# Patient Record
Sex: MALE | Race: OTHER | HISPANIC OR LATINO | URBAN - METROPOLITAN AREA
[De-identification: names, ages, dates, MRNs, and addresses within clinical notes are randomized per-mention and may not be internally consistent; named-entity substitution may affect disease eponyms.]

---

## 2019-06-11 ENCOUNTER — EMERGENCY (EMERGENCY)
Facility: HOSPITAL | Age: 39
LOS: 1 days | Discharge: HOME | End: 2019-06-11
Attending: EMERGENCY MEDICINE
Payer: MEDICAID

## 2019-06-11 VITALS
OXYGEN SATURATION: 98 % | HEART RATE: 74 BPM | WEIGHT: 190.04 LBS | SYSTOLIC BLOOD PRESSURE: 130 MMHG | DIASTOLIC BLOOD PRESSURE: 83 MMHG | TEMPERATURE: 98 F | HEIGHT: 67 IN | RESPIRATION RATE: 18 BRPM

## 2019-06-11 LAB
ALBUMIN SERPL ELPH-MCNC: 3.9 G/DL — SIGNIFICANT CHANGE UP (ref 3.3–5)
ALP SERPL-CCNC: 75 U/L — SIGNIFICANT CHANGE UP (ref 40–120)
ALT FLD-CCNC: 15 U/L — SIGNIFICANT CHANGE UP (ref 10–45)
ANION GAP SERPL CALC-SCNC: 14 MMOL/L — SIGNIFICANT CHANGE UP (ref 5–17)
APPEARANCE UR: CLEAR — SIGNIFICANT CHANGE UP
AST SERPL-CCNC: 14 U/L — SIGNIFICANT CHANGE UP (ref 10–40)
BACTERIA # UR AUTO: NEGATIVE — SIGNIFICANT CHANGE UP
BASOPHILS # BLD AUTO: 0 K/UL — SIGNIFICANT CHANGE UP (ref 0–0.2)
BASOPHILS NFR BLD AUTO: 0.2 % — SIGNIFICANT CHANGE UP (ref 0–2)
BILIRUB SERPL-MCNC: 0.1 MG/DL — LOW (ref 0.2–1.2)
BILIRUB UR-MCNC: NEGATIVE — SIGNIFICANT CHANGE UP
BUN SERPL-MCNC: 18 MG/DL — SIGNIFICANT CHANGE UP (ref 7–23)
CALCIUM SERPL-MCNC: 9.3 MG/DL — SIGNIFICANT CHANGE UP (ref 8.4–10.5)
CHLORIDE SERPL-SCNC: 104 MMOL/L — SIGNIFICANT CHANGE UP (ref 96–108)
CO2 SERPL-SCNC: 23 MMOL/L — SIGNIFICANT CHANGE UP (ref 22–31)
COLOR SPEC: SIGNIFICANT CHANGE UP
CREAT SERPL-MCNC: 1.41 MG/DL — HIGH (ref 0.5–1.3)
DIFF PNL FLD: ABNORMAL
EOSINOPHIL # BLD AUTO: 0.1 K/UL — SIGNIFICANT CHANGE UP (ref 0–0.5)
EOSINOPHIL NFR BLD AUTO: 0.5 % — SIGNIFICANT CHANGE UP (ref 0–6)
EPI CELLS # UR: 2 /HPF — SIGNIFICANT CHANGE UP
GLUCOSE SERPL-MCNC: 108 MG/DL — HIGH (ref 70–99)
GLUCOSE UR QL: NEGATIVE — SIGNIFICANT CHANGE UP
HCT VFR BLD CALC: 42.1 % — SIGNIFICANT CHANGE UP (ref 39–50)
HGB BLD-MCNC: 14 G/DL — SIGNIFICANT CHANGE UP (ref 13–17)
HYALINE CASTS # UR AUTO: 1 /LPF — SIGNIFICANT CHANGE UP (ref 0–2)
KETONES UR-MCNC: NEGATIVE — SIGNIFICANT CHANGE UP
LEUKOCYTE ESTERASE UR-ACNC: NEGATIVE — SIGNIFICANT CHANGE UP
LIDOCAIN IGE QN: 34 U/L — SIGNIFICANT CHANGE UP (ref 7–60)
LYMPHOCYTES # BLD AUTO: 2.8 K/UL — SIGNIFICANT CHANGE UP (ref 1–3.3)
LYMPHOCYTES # BLD AUTO: 24.2 % — SIGNIFICANT CHANGE UP (ref 13–44)
MCHC RBC-ENTMCNC: 29.1 PG — SIGNIFICANT CHANGE UP (ref 27–34)
MCHC RBC-ENTMCNC: 33.4 GM/DL — SIGNIFICANT CHANGE UP (ref 32–36)
MCV RBC AUTO: 87.3 FL — SIGNIFICANT CHANGE UP (ref 80–100)
MONOCYTES # BLD AUTO: 1.1 K/UL — HIGH (ref 0–0.9)
MONOCYTES NFR BLD AUTO: 9.2 % — SIGNIFICANT CHANGE UP (ref 2–14)
NEUTROPHILS # BLD AUTO: 7.6 K/UL — HIGH (ref 1.8–7.4)
NEUTROPHILS NFR BLD AUTO: 65.9 % — SIGNIFICANT CHANGE UP (ref 43–77)
NITRITE UR-MCNC: NEGATIVE — SIGNIFICANT CHANGE UP
PH UR: 6 — SIGNIFICANT CHANGE UP (ref 5–8)
PLATELET # BLD AUTO: 197 K/UL — SIGNIFICANT CHANGE UP (ref 150–400)
POTASSIUM SERPL-MCNC: 4 MMOL/L — SIGNIFICANT CHANGE UP (ref 3.5–5.3)
POTASSIUM SERPL-SCNC: 4 MMOL/L — SIGNIFICANT CHANGE UP (ref 3.5–5.3)
PROT SERPL-MCNC: 7.1 G/DL — SIGNIFICANT CHANGE UP (ref 6–8.3)
PROT UR-MCNC: ABNORMAL
RBC # BLD: 4.82 M/UL — SIGNIFICANT CHANGE UP (ref 4.2–5.8)
RBC # FLD: 12.3 % — SIGNIFICANT CHANGE UP (ref 10.3–14.5)
RBC CASTS # UR COMP ASSIST: 2 /HPF — SIGNIFICANT CHANGE UP (ref 0–4)
SODIUM SERPL-SCNC: 141 MMOL/L — SIGNIFICANT CHANGE UP (ref 135–145)
SP GR SPEC: 1.03 — HIGH (ref 1.01–1.02)
UROBILINOGEN FLD QL: NEGATIVE — SIGNIFICANT CHANGE UP
WBC # BLD: 11.5 K/UL — HIGH (ref 3.8–10.5)
WBC # FLD AUTO: 11.5 K/UL — HIGH (ref 3.8–10.5)
WBC UR QL: 3 /HPF — SIGNIFICANT CHANGE UP (ref 0–5)

## 2019-06-11 PROCEDURE — 99218: CPT

## 2019-06-11 PROCEDURE — 74176 CT ABD & PELVIS W/O CONTRAST: CPT | Mod: 26

## 2019-06-11 RX ORDER — TAMSULOSIN HYDROCHLORIDE 0.4 MG/1
0.4 CAPSULE ORAL ONCE
Refills: 0 | Status: COMPLETED | OUTPATIENT
Start: 2019-06-11 | End: 2019-06-11

## 2019-06-11 RX ORDER — DOCUSATE SODIUM 100 MG
100 CAPSULE ORAL DAILY
Refills: 0 | Status: DISCONTINUED | OUTPATIENT
Start: 2019-06-11 | End: 2019-06-15

## 2019-06-11 RX ORDER — ONDANSETRON 8 MG/1
4 TABLET, FILM COATED ORAL ONCE
Refills: 0 | Status: COMPLETED | OUTPATIENT
Start: 2019-06-11 | End: 2019-06-11

## 2019-06-11 RX ORDER — SODIUM CHLORIDE 9 MG/ML
1000 INJECTION, SOLUTION INTRAVENOUS ONCE
Refills: 0 | Status: COMPLETED | OUTPATIENT
Start: 2019-06-11 | End: 2019-06-11

## 2019-06-11 RX ORDER — MORPHINE SULFATE 50 MG/1
4 CAPSULE, EXTENDED RELEASE ORAL ONCE
Refills: 0 | Status: DISCONTINUED | OUTPATIENT
Start: 2019-06-11 | End: 2019-06-11

## 2019-06-11 RX ORDER — SODIUM CHLORIDE 9 MG/ML
1000 INJECTION INTRAMUSCULAR; INTRAVENOUS; SUBCUTANEOUS ONCE
Refills: 0 | Status: COMPLETED | OUTPATIENT
Start: 2019-06-11 | End: 2019-06-11

## 2019-06-11 RX ORDER — SODIUM CHLORIDE 9 MG/ML
3 INJECTION INTRAMUSCULAR; INTRAVENOUS; SUBCUTANEOUS EVERY 8 HOURS
Refills: 0 | Status: DISCONTINUED | OUTPATIENT
Start: 2019-06-11 | End: 2019-06-15

## 2019-06-11 RX ORDER — SODIUM CHLORIDE 9 MG/ML
1000 INJECTION INTRAMUSCULAR; INTRAVENOUS; SUBCUTANEOUS
Refills: 0 | Status: DISCONTINUED | OUTPATIENT
Start: 2019-06-11 | End: 2019-06-15

## 2019-06-11 RX ORDER — MORPHINE SULFATE 50 MG/1
4 CAPSULE, EXTENDED RELEASE ORAL ONCE
Refills: 0 | Status: DISCONTINUED | OUTPATIENT
Start: 2019-06-11 | End: 2019-06-15

## 2019-06-11 RX ORDER — KETOROLAC TROMETHAMINE 30 MG/ML
15 SYRINGE (ML) INJECTION ONCE
Refills: 0 | Status: DISCONTINUED | OUTPATIENT
Start: 2019-06-11 | End: 2019-06-11

## 2019-06-11 RX ADMIN — MORPHINE SULFATE 4 MILLIGRAM(S): 50 CAPSULE, EXTENDED RELEASE ORAL at 18:10

## 2019-06-11 RX ADMIN — MORPHINE SULFATE 4 MILLIGRAM(S): 50 CAPSULE, EXTENDED RELEASE ORAL at 19:00

## 2019-06-11 RX ADMIN — ONDANSETRON 4 MILLIGRAM(S): 8 TABLET, FILM COATED ORAL at 18:10

## 2019-06-11 RX ADMIN — TAMSULOSIN HYDROCHLORIDE 0.4 MILLIGRAM(S): 0.4 CAPSULE ORAL at 21:27

## 2019-06-11 RX ADMIN — Medication 15 MILLIGRAM(S): at 18:10

## 2019-06-11 RX ADMIN — SODIUM CHLORIDE 3 MILLILITER(S): 9 INJECTION INTRAMUSCULAR; INTRAVENOUS; SUBCUTANEOUS at 22:35

## 2019-06-11 RX ADMIN — SODIUM CHLORIDE 150 MILLILITER(S): 9 INJECTION INTRAMUSCULAR; INTRAVENOUS; SUBCUTANEOUS at 22:35

## 2019-06-11 RX ADMIN — SODIUM CHLORIDE 1000 MILLILITER(S): 9 INJECTION INTRAMUSCULAR; INTRAVENOUS; SUBCUTANEOUS at 18:10

## 2019-06-11 RX ADMIN — SODIUM CHLORIDE 1000 MILLILITER(S): 9 INJECTION, SOLUTION INTRAVENOUS at 20:55

## 2019-06-11 RX ADMIN — Medication 15 MILLIGRAM(S): at 19:00

## 2019-06-11 RX ADMIN — Medication 100 MILLIGRAM(S): at 22:34

## 2019-06-11 NOTE — CONSULT NOTE ADULT - SUBJECTIVE AND OBJECTIVE BOX
HPI:  Patient is a 39y Male who presented with complaints of right flank pain x early this am.  subjective fever and chills- took ibuprofen this am.  +nausea, no vomiting  +dysuria, no hematuria  no hx of nephrolithiasis    PAST MEDICAL & SURGICAL HISTORY:  No pertinent past medical history  No significant past surgical history    FAMILY HISTORY:    SOCIAL HISTORY:   Tobacco hx:    MEDICATIONS  (STANDING):    MEDICATIONS  (PRN):    Allergies    No Known Allergies    Intolerances        REVIEW OF SYSTEMS: Pertinent positives and negatives as stated in HPI, otherwise negative    Vital signs  T(C): 36.6 (19 @ 19:02), Max: 36.7 (19 @ 17:33)  HR: 72 (19 @ 19:02)  BP: 111/67 (19 @ 19:02)  SpO2: 97% (19 @ 19:02)    Physical Exam  Gen: NAD  Pulm: No intercostal retractions  Back: No CVAT b/l  Abd: Soft, +RLQ tenderness to deep palpation, no rebound, no guarding  : wnl    LABS:     @ 18:26    WBC 11.5  / Hct 42.1  / SCr 1.41         141  |  104  |  18  ----------------------------<  108<H>  4.0   |  23  |  1.41<H>    Ca    9.3      2019 18:26    TPro  7.1  /  Alb  3.9  /  TBili  0.1<L>  /  DBili  x   /  AST  14  /  ALT  15  /  AlkPhos  75        Urinalysis Basic - ( 2019 18:26 )    Color: Light Yellow / Appearance: Clear / S.034 / pH: x  Gluc: x / Ketone: Negative  / Bili: Negative / Urobili: Negative   Blood: x / Protein: Trace / Nitrite: Negative   Leuk Esterase: Negative / RBC: 2 /hpf / WBC 3 /HPF   Sq Epi: x / Non Sq Epi: 2 /hpf / Bacteria: Negative      RADIOLOGY:  EXAM:  CT ABDOMEN AND PELVIS                            PROCEDURE DATE:  2019        INTERPRETATION:  CLINICAL INFORMATION: Right flank pain. Stone disease   suspected.    COMPARISON: None.    PROCEDURE:   CT of the Abdomen and Pelvis was performed without intravenous contrast   in the prone position.  Intravenous contrast: None.  Oral contrast: None.  Sagittal and coronal reformats were performed.    FINDINGS:    Evaluation of the solid organs and vasculature is limited in the absence   of intravenous contrast.    LOWER CHEST: Calcified granuloma in the right lower lobe.    LIVER: Within normal limits.  BILE DUCTS: Normal caliber.  GALLBLADDER: Within normal limits.  SPLEEN: Within normal limits.  PANCREAS: Within normal limits.  ADRENALS: Within normal limits.  KIDNEYS/URETERS: Mild right hydronephrosis and right perinephric   stranding due to a 2 mm renal calculus within the distal right ureter. No   hydronephrosis or nephrolithiasis in the left renal collecting system.    BLADDER: The urinary bladder is underdistended.  REPRODUCTIVE ORGANS: The prostate is normal in size.    BOWEL: No bowel obstruction. Appendix is normal.  PERITONEUM: No ascites.  VESSELS:  Within normal limits.  RETROPERITONEUM: No lymphadenopathy.    ABDOMINAL WALL: Within normal limits.  BONES: Within normal limits.    IMPRESSION:     Mild right hydronephrosis due to a 2 mm renal calculus in the distal   right ureter.      INOCENTE VENCES M.D., RADIOLOGY RESIDENT  This document has been electronically signed.  FRANKLIN BIRMINGHAM M.D., ATTENDING RADIOLOGIST  This document has been electronically signed. 2019  7:09PM

## 2019-06-11 NOTE — ED PROVIDER NOTE - MUSCULOSKELETAL, MLM
Spine appears normal, range of motion including B/L knees is not limited, no muscle or joint tenderness. No bony point ttp of femur, patella or prox tib/fib of either extremity. Ambulating c/ ease.

## 2019-06-11 NOTE — ED PROVIDER NOTE - PROGRESS NOTE DETAILS
note appreciated. Will place in CDU for more continuous IVF and to allow for trending of his renal fxn prior to final disposition.

## 2019-06-11 NOTE — ED ADULT NURSE NOTE - OBJECTIVE STATEMENT
40 yo presents to the ED from home. A&Ox4, ambulatory c/o Right flank pain radiating to groin since this AM. painful urination. reports chills this morning, no fever. reports nausea, no vomiting. no history of kidney stones. pt well appearing. did not take anything at home for pain control. reports that he fell while on the way to bathroom today and reports bilateral knee pain since the fall. ambulating without any difficulties. 20G LAC. pt well appearing. afebrile upon assessment.

## 2019-06-11 NOTE — ED PROVIDER NOTE - OBJECTIVE STATEMENT
Otherwise healthy 40 y/o Citizen of [place]-speaking (translation provided by [facility]) male who presents c/ a cc of R flank pain since earlier this AM.

## 2019-06-11 NOTE — CONSULT NOTE ADULT - ATTENDING COMMENTS
Agree with assessment and plan.    CT scan demonstrated 2 mm right ureteral stone.  Hydration  Expulsive medical therapy with Flomax  Outpatient followup in 1 week.    Discussed with Dr. Neto Mckeon

## 2019-06-11 NOTE — ED PROVIDER NOTE - CHPI ED SYMPTOMS POS
PAIN/DYSURIA/NAUSEA/CHILLS/PAINFUL URINATION/Also mentions his falling on his knees and now has B/L knee pain.

## 2019-06-11 NOTE — ED PROVIDER NOTE - CLINICAL SUMMARY MEDICAL DECISION MAKING FREE TEXT BOX
Appropriate screening studies obtained. Analgesia, antiemetic and IVF provided. Does not need imaging of his knees. Will follow his studies, reassess and treat/dispo accordingly.

## 2019-06-11 NOTE — ED CDU PROVIDER INITIAL DAY NOTE - CHPI ED SYMPTOMS POS
NAUSEA/PAINFUL URINATION/CHILLS/DYSURIA/PAIN/Also mentions his falling on his knees and now has B/L knee pain.

## 2019-06-11 NOTE — ED CDU PROVIDER INITIAL DAY NOTE - OBJECTIVE STATEMENT
Otherwise healthy 40 y/o [place]ian-speaking (translation provided by [facility]) male who presents c/ a cc of R flank pain since earlier this AM. 40 y/o male denies significant pmhx presented to ED c/o R flank pain since earlier this AM. Pt described pain as sharp accompanied by nausea, chills and painful urination.  In ED, patient had elevated Creatinine 1.41 and CT abdomen/pelvis w/o contrast showing Mild right hydronephrosis due to a 2 mm renal calculus in the distal right ureter. Patient given Flomax 0.4mg evaluated by Urology and sent to CDU for frequent reeval, vitals q 4hrs, pain control and IVF hydration.

## 2019-06-11 NOTE — CONSULT NOTE ADULT - ASSESSMENT
40 y/o male with 2mm right distal ureteral calculi      Rec:  -please send urine culture  -aggressive hydration  -strain urine  -can dc with analgesics and flomax 0.4mg daily vs CDU for creatinine recheck in the am  -if dc, return to ED if fever, poor pain control or intractable nausea/vomiting  will discuss with attending, full recs to follow 40 y/o male with 2mm right distal ureteral calculi      Rec:  -f/u Ucx  -Trial of medical expulsive therapy  -Flomax 0.4mg qHS x30 days  -Tylenol, ibuprofen, & Tramadol PRN pain  -Zofran PRN nausea  -Senna, colace, miralax  -Aggressive hydration  -Strain urine for calculi  -Return to ER for any fever > 100.4, pain uncontrolled on discharge pain medications, or inability to tolerate PO  -Follow up with Dr. Yeung in clinic [phone].     Discussed with Dr. Yeung

## 2019-06-11 NOTE — ED CDU PROVIDER INITIAL DAY NOTE - MEDICAL DECISION MAKING DETAILS
Placed in the CDU to allow for clinical reassessment, more continuous IV hydration and trending of his renal fxn.

## 2019-06-11 NOTE — ED CDU PROVIDER INITIAL DAY NOTE - PROGRESS NOTE DETAILS
CDU PROGRESS NOTE PA KAVYA: Pt resting comfortably, NAD, VSS. Pt reports having mild discomfort to right flank 5/10 in severity and feeling constipated. Pt declined pain medication. Will continue to monitor, Colace 100mg po given.

## 2019-06-11 NOTE — ED ADULT NURSE NOTE - BREATH SOUNDS, MLM
Please send letter to patient informing her that her opiate medication agreement has been terminated as of today, 5/5/17.  No further opiates will be provided due to inappropriate UDS, specifically she was + for THC.     Please call pharmacy and cancel all tramadol refills for this patient. She last filled 5/1/17 at [facility] [address].   
Prescription refills for Tramadol 50 mg were cancelled at [facility]. Discontinuation of narcotics letter mailed to patient today.  
Clear

## 2019-06-12 VITALS
HEART RATE: 76 BPM | RESPIRATION RATE: 18 BRPM | SYSTOLIC BLOOD PRESSURE: 120 MMHG | TEMPERATURE: 98 F | DIASTOLIC BLOOD PRESSURE: 76 MMHG | OXYGEN SATURATION: 98 %

## 2019-06-12 LAB
ANION GAP SERPL CALC-SCNC: 8 MMOL/L — SIGNIFICANT CHANGE UP (ref 5–17)
BASOPHILS # BLD AUTO: 0 K/UL — SIGNIFICANT CHANGE UP (ref 0–0.2)
BASOPHILS NFR BLD AUTO: 0.3 % — SIGNIFICANT CHANGE UP (ref 0–2)
BUN SERPL-MCNC: 11 MG/DL — SIGNIFICANT CHANGE UP (ref 7–23)
CALCIUM SERPL-MCNC: 8.3 MG/DL — LOW (ref 8.4–10.5)
CHLORIDE SERPL-SCNC: 106 MMOL/L — SIGNIFICANT CHANGE UP (ref 96–108)
CO2 SERPL-SCNC: 25 MMOL/L — SIGNIFICANT CHANGE UP (ref 22–31)
CREAT SERPL-MCNC: 0.96 MG/DL — SIGNIFICANT CHANGE UP (ref 0.5–1.3)
CULTURE RESULTS: SIGNIFICANT CHANGE UP
EOSINOPHIL # BLD AUTO: 0.1 K/UL — SIGNIFICANT CHANGE UP (ref 0–0.5)
EOSINOPHIL NFR BLD AUTO: 0.7 % — SIGNIFICANT CHANGE UP (ref 0–6)
GLUCOSE SERPL-MCNC: 109 MG/DL — HIGH (ref 70–99)
HCT VFR BLD CALC: 39.1 % — SIGNIFICANT CHANGE UP (ref 39–50)
HGB BLD-MCNC: 12.9 G/DL — LOW (ref 13–17)
LYMPHOCYTES # BLD AUTO: 1.6 K/UL — SIGNIFICANT CHANGE UP (ref 1–3.3)
LYMPHOCYTES # BLD AUTO: 19.9 % — SIGNIFICANT CHANGE UP (ref 13–44)
MCHC RBC-ENTMCNC: 28.9 PG — SIGNIFICANT CHANGE UP (ref 27–34)
MCHC RBC-ENTMCNC: 33 GM/DL — SIGNIFICANT CHANGE UP (ref 32–36)
MCV RBC AUTO: 87.4 FL — SIGNIFICANT CHANGE UP (ref 80–100)
MONOCYTES # BLD AUTO: 0.7 K/UL — SIGNIFICANT CHANGE UP (ref 0–0.9)
MONOCYTES NFR BLD AUTO: 8.6 % — SIGNIFICANT CHANGE UP (ref 2–14)
NEUTROPHILS # BLD AUTO: 5.7 K/UL — SIGNIFICANT CHANGE UP (ref 1.8–7.4)
NEUTROPHILS NFR BLD AUTO: 70.6 % — SIGNIFICANT CHANGE UP (ref 43–77)
PLATELET # BLD AUTO: 177 K/UL — SIGNIFICANT CHANGE UP (ref 150–400)
POTASSIUM SERPL-MCNC: 3.9 MMOL/L — SIGNIFICANT CHANGE UP (ref 3.5–5.3)
POTASSIUM SERPL-SCNC: 3.9 MMOL/L — SIGNIFICANT CHANGE UP (ref 3.5–5.3)
RBC # BLD: 4.47 M/UL — SIGNIFICANT CHANGE UP (ref 4.2–5.8)
RBC # FLD: 12.2 % — SIGNIFICANT CHANGE UP (ref 10.3–14.5)
SODIUM SERPL-SCNC: 139 MMOL/L — SIGNIFICANT CHANGE UP (ref 135–145)
SPECIMEN SOURCE: SIGNIFICANT CHANGE UP
WBC # BLD: 8.1 K/UL — SIGNIFICANT CHANGE UP (ref 3.8–10.5)
WBC # FLD AUTO: 8.1 K/UL — SIGNIFICANT CHANGE UP (ref 3.8–10.5)

## 2019-06-12 PROCEDURE — 99217: CPT

## 2019-06-12 PROCEDURE — 96375 TX/PRO/DX INJ NEW DRUG ADDON: CPT

## 2019-06-12 PROCEDURE — G0378: CPT

## 2019-06-12 PROCEDURE — 83690 ASSAY OF LIPASE: CPT

## 2019-06-12 PROCEDURE — 96374 THER/PROPH/DIAG INJ IV PUSH: CPT

## 2019-06-12 PROCEDURE — 99284 EMERGENCY DEPT VISIT MOD MDM: CPT | Mod: 25

## 2019-06-12 PROCEDURE — 81001 URINALYSIS AUTO W/SCOPE: CPT

## 2019-06-12 PROCEDURE — 87086 URINE CULTURE/COLONY COUNT: CPT

## 2019-06-12 PROCEDURE — 80048 BASIC METABOLIC PNL TOTAL CA: CPT

## 2019-06-12 PROCEDURE — 74176 CT ABD & PELVIS W/O CONTRAST: CPT

## 2019-06-12 PROCEDURE — 80053 COMPREHEN METABOLIC PANEL: CPT

## 2019-06-12 PROCEDURE — 85027 COMPLETE CBC AUTOMATED: CPT

## 2019-06-12 RX ORDER — TAMSULOSIN HYDROCHLORIDE 0.4 MG/1
1 CAPSULE ORAL
Qty: 5 | Refills: 0
Start: 2019-06-12 | End: 2019-06-16

## 2019-06-12 RX ORDER — OXYCODONE HYDROCHLORIDE 5 MG/1
1 TABLET ORAL
Qty: 12 | Refills: 0
Start: 2019-06-12 | End: 2019-06-14

## 2019-06-12 RX ORDER — ONDANSETRON 8 MG/1
1 TABLET, FILM COATED ORAL
Qty: 9 | Refills: 0
Start: 2019-06-12 | End: 2019-06-14

## 2019-06-12 RX ADMIN — SODIUM CHLORIDE 150 MILLILITER(S): 9 INJECTION INTRAMUSCULAR; INTRAVENOUS; SUBCUTANEOUS at 05:01

## 2019-06-12 RX ADMIN — SODIUM CHLORIDE 3 MILLILITER(S): 9 INJECTION INTRAMUSCULAR; INTRAVENOUS; SUBCUTANEOUS at 07:11

## 2019-06-12 NOTE — ED CDU PROVIDER SUBSEQUENT DAY NOTE - PROGRESS NOTE DETAILS
CDU PROGRESS NOTE TRISH MCKENNA: Pt resting comfortably, feeling well without complaint. NAD, VSS. Patient feeling significantly improved. Reevaluated by urology this morning. Creatinine normalized and patient cleared by urology team for discharge. Will provide follow-up instructions and strict return precautions. D/c stable home d/w Dr. Hill

## 2019-06-12 NOTE — PROGRESS NOTE ADULT - ASSESSMENT
38 y/o male with 2mm right distal ureteral calculus, pain controlled, tolerating PO, afebrile    Rec:  -f/u Ucx  -Trial of medical expulsive therapy  -Flomax 0.4mg qHS x30 days  -Tylenol, ibuprofen, & Tramadol PRN pain  -Zofran PRN nausea  -Senna, colace, miralax  -Aggressive hydration  -Strain urine for calculi  -Discharge home  -Return to ER for any fever > 100.4, pain uncontrolled on discharge pain medications, or inability to tolerate PO  -Follow up with Dr. Yeung in clinic [phone].

## 2019-06-12 NOTE — PROGRESS NOTE ADULT - SUBJECTIVE AND OBJECTIVE BOX
Patient pain controlled after 4mg morphine yesterday evening.  Remains afebrile, no indication for ureteral stents.    T(F): 98.5, Max: 98.7 (06-12-19 @ 00:45)  HR: 85  BP: 95/56  SpO2: 97%    Gen   Abd      06-11 @ 18:26  WBC 11.5  / Hct 42.1  / SCr 1.41 Patient pain controlled after 4mg morphine yesterday evening.  Remains afebrile, no indication for ureteral stents.    T(F): 98.5, Max: 98.7 (06-12-19 @ 00:45)  HR: 85  BP: 95/56  SpO2: 97%    Gen NAD   No CVAT    06-11 @ 18:26  WBC 11.5  / Hct 42.1  / SCr 1.41

## 2019-06-12 NOTE — ED CDU PROVIDER SUBSEQUENT DAY NOTE - HISTORY
CDU PROGRESS NOTE PA KAVYA: Pt resting comfortably, feeling well without complaint, denies abdominal pain. NAD, VSS. Will continue to monitor.

## 2019-06-12 NOTE — ED CDU PROVIDER DISPOSITION NOTE - CLINICAL COURSE
38 y/o male denies significant pmhx presented to ED c/o R flank pain since earlier this AM. Pt described pain as sharp accompanied by nausea, chills and painful urination.  In ED, patient had elevated Creatinine 1.41 and CT abdomen/pelvis w/o contrast showing Mild right hydronephrosis due to a 2 mm renal calculus in the distal right ureter. Patient given Flomax 0.4mg evaluated by Urology and sent to CDU for frequent reeval, vitals q 4hrs, pain control and IVF hydration. 38 y/o male denies significant pmhx presented to ED c/o R flank pain since earlier this AM. Pt described pain as sharp accompanied by nausea, chills and painful urination.  In ED, patient had elevated Creatinine 1.41 and CT abdomen/pelvis w/o contrast showing Mild right hydronephrosis due to a 2 mm renal calculus in the distal right ureter. Patient given Flomax 0.4mg evaluated by Urology and sent to CDU for frequent reeval, vitals q 4hrs, pain control and IVF hydration.  Patient had no acute events overnight and repeat creatinine normalized to 0.96. Patient was reassessed in AM by urology and cleared for discharge to follow-up. Strict return precautions and d/c instructions given and patient stable for d/c d/w Dr. Hill.

## 2019-06-12 NOTE — ED CDU PROVIDER DISPOSITION NOTE - PLAN OF CARE
STAY HYDRATED and Strain Urine. Follow up with your Primary Care Physician within the next 2-3 days as well as urology clinic [phone]. Bring a copy of your test results with you to your appointment  Continue your current medication regimen. Return to the Emergency Room if you experience new or worsening symptoms .Oxycodone 5mg every 6 hours as needed for pain. Do not drive or consume alcohol while taking. Take Flomax once daily. Take Tylenol 500mg every 6 hrs as needed for pain. Take with food

## 2019-06-12 NOTE — ED ADULT NURSE REASSESSMENT NOTE - NS ED NURSE REASSESS COMMENT FT1
Received report from ED RN Lorenza; patient A&Ox3, afebrile, VSS, 20 g IV in L AC patent and site WNL. Patient pending urology consult.
Report taken from Manju LEY. States pt did well overnight. No complaints. Will continue to monitor.
Pt received from AV Lopes. Pt oriented to CDU & plan of care was discussed. Pt complains of 5/10 R flank pain radiating to RLQ. Pt denies any urgency or frequency with urination but endorses burning with urination. Pt also endorses feeling constipated and bloated and is requesting meds to assit. Safety & comfort measures maintained. Call bell in reach. Will continue to monitor.

## 2019-06-12 NOTE — ED CDU PROVIDER DISPOSITION NOTE - NSFOLLOWUPINSTRUCTIONS_ED_ALL_ED_FT
1. Continue to hydrate yourself well throughout the day   2. Take flomax once daily for 5 days to help with passage of stone   3. Take zofran up to every 8 hours as needed for nausea/vomiting  4. Take ibuprofen 600mg every 6 hours as needed for pain   5. Take oxycodone 5mg every 6 hours as needed for very severe pain. Do not drink alcohol or drive with this medication as it will cause drowsiness  6. Follow-up with your urologist or our urology clinic at [phone] within a week for reevaluation   7. Follow-up with your primary care physician this week   8. Return to the ER for any fevers, urinary symptoms, or other new/concerning symptoms

## 2019-06-12 NOTE — PROGRESS NOTE ADULT - ATTENDING COMMENTS
Agree with assessment and plan.    CT scan demonstrated 2 mm right ureteral stone.  Hydration  Expulsive medical therapy with Flomax  Outpatient followup in 1 week.

## 2019-06-12 NOTE — ED CDU PROVIDER DISPOSITION NOTE - ATTENDING CONTRIBUTION TO CARE
CDU Attending Note -- Pt seen and examined at bedside.  Case discussed c CDU PA.  Pt comfortable, asymptomatic, and has good follow up.  Tolerating PO, pain free, not vomiting, stable for d/c.  Strict return precautions provided.  --BMM

## 2019-08-05 ENCOUNTER — EMERGENCY (EMERGENCY)
Facility: HOSPITAL | Age: 39
LOS: 1 days | Discharge: HOME | End: 2019-08-05
Attending: EMERGENCY MEDICINE
Payer: MEDICAID

## 2019-08-05 VITALS
WEIGHT: 190.04 LBS | DIASTOLIC BLOOD PRESSURE: 82 MMHG | OXYGEN SATURATION: 98 % | RESPIRATION RATE: 18 BRPM | HEIGHT: 70 IN | HEART RATE: 90 BPM | SYSTOLIC BLOOD PRESSURE: 122 MMHG | TEMPERATURE: 98 F

## 2019-08-05 VITALS
HEART RATE: 89 BPM | DIASTOLIC BLOOD PRESSURE: 86 MMHG | RESPIRATION RATE: 18 BRPM | SYSTOLIC BLOOD PRESSURE: 121 MMHG | OXYGEN SATURATION: 99 % | TEMPERATURE: 98 F

## 2019-08-05 PROBLEM — Z78.9 OTHER SPECIFIED HEALTH STATUS: Chronic | Status: ACTIVE | Noted: 2019-06-11

## 2019-08-05 LAB
ALBUMIN SERPL ELPH-MCNC: 4.1 G/DL — SIGNIFICANT CHANGE UP (ref 3.3–5)
ALP SERPL-CCNC: 74 U/L — SIGNIFICANT CHANGE UP (ref 40–120)
ALT FLD-CCNC: 24 U/L — SIGNIFICANT CHANGE UP (ref 10–45)
ANION GAP SERPL CALC-SCNC: 13 MMOL/L — SIGNIFICANT CHANGE UP (ref 5–17)
APPEARANCE UR: ABNORMAL
APTT BLD: 31.7 SEC — SIGNIFICANT CHANGE UP (ref 27.5–36.3)
AST SERPL-CCNC: 20 U/L — SIGNIFICANT CHANGE UP (ref 10–40)
BACTERIA # UR AUTO: NEGATIVE — SIGNIFICANT CHANGE UP
BASOPHILS # BLD AUTO: 0 K/UL — SIGNIFICANT CHANGE UP (ref 0–0.2)
BASOPHILS NFR BLD AUTO: 0.4 % — SIGNIFICANT CHANGE UP (ref 0–2)
BILIRUB SERPL-MCNC: 0.2 MG/DL — SIGNIFICANT CHANGE UP (ref 0.2–1.2)
BILIRUB UR-MCNC: NEGATIVE — SIGNIFICANT CHANGE UP
BUN SERPL-MCNC: 12 MG/DL — SIGNIFICANT CHANGE UP (ref 7–23)
CALCIUM SERPL-MCNC: 9.5 MG/DL — SIGNIFICANT CHANGE UP (ref 8.4–10.5)
CHLORIDE SERPL-SCNC: 103 MMOL/L — SIGNIFICANT CHANGE UP (ref 96–108)
CO2 SERPL-SCNC: 25 MMOL/L — SIGNIFICANT CHANGE UP (ref 22–31)
COLOR SPEC: YELLOW — SIGNIFICANT CHANGE UP
CREAT SERPL-MCNC: 1.05 MG/DL — SIGNIFICANT CHANGE UP (ref 0.5–1.3)
DIFF PNL FLD: NEGATIVE — SIGNIFICANT CHANGE UP
EOSINOPHIL # BLD AUTO: 0.1 K/UL — SIGNIFICANT CHANGE UP (ref 0–0.5)
EOSINOPHIL NFR BLD AUTO: 1.5 % — SIGNIFICANT CHANGE UP (ref 0–6)
EPI CELLS # UR: 3 /HPF — SIGNIFICANT CHANGE UP
GAS PNL BLDV: SIGNIFICANT CHANGE UP
GLUCOSE SERPL-MCNC: 93 MG/DL — SIGNIFICANT CHANGE UP (ref 70–99)
GLUCOSE UR QL: NEGATIVE — SIGNIFICANT CHANGE UP
HCT VFR BLD CALC: 45.6 % — SIGNIFICANT CHANGE UP (ref 39–50)
HGB BLD-MCNC: 14.6 G/DL — SIGNIFICANT CHANGE UP (ref 13–17)
HYALINE CASTS # UR AUTO: 0 /LPF — SIGNIFICANT CHANGE UP (ref 0–2)
INR BLD: 1.04 RATIO — SIGNIFICANT CHANGE UP (ref 0.88–1.16)
KETONES UR-MCNC: NEGATIVE — SIGNIFICANT CHANGE UP
LEUKOCYTE ESTERASE UR-ACNC: NEGATIVE — SIGNIFICANT CHANGE UP
LYMPHOCYTES # BLD AUTO: 2.9 K/UL — SIGNIFICANT CHANGE UP (ref 1–3.3)
LYMPHOCYTES # BLD AUTO: 35.9 % — SIGNIFICANT CHANGE UP (ref 13–44)
MCHC RBC-ENTMCNC: 27.6 PG — SIGNIFICANT CHANGE UP (ref 27–34)
MCHC RBC-ENTMCNC: 32 GM/DL — SIGNIFICANT CHANGE UP (ref 32–36)
MCV RBC AUTO: 86.4 FL — SIGNIFICANT CHANGE UP (ref 80–100)
MONOCYTES # BLD AUTO: 0.8 K/UL — SIGNIFICANT CHANGE UP (ref 0–0.9)
MONOCYTES NFR BLD AUTO: 10.4 % — SIGNIFICANT CHANGE UP (ref 2–14)
NEUTROPHILS # BLD AUTO: 4.2 K/UL — SIGNIFICANT CHANGE UP (ref 1.8–7.4)
NEUTROPHILS NFR BLD AUTO: 51.8 % — SIGNIFICANT CHANGE UP (ref 43–77)
NITRITE UR-MCNC: NEGATIVE — SIGNIFICANT CHANGE UP
PH UR: 8 — SIGNIFICANT CHANGE UP (ref 5–8)
PLATELET # BLD AUTO: 207 K/UL — SIGNIFICANT CHANGE UP (ref 150–400)
POTASSIUM SERPL-MCNC: 3.9 MMOL/L — SIGNIFICANT CHANGE UP (ref 3.5–5.3)
POTASSIUM SERPL-SCNC: 3.9 MMOL/L — SIGNIFICANT CHANGE UP (ref 3.5–5.3)
PROT SERPL-MCNC: 7.6 G/DL — SIGNIFICANT CHANGE UP (ref 6–8.3)
PROT UR-MCNC: ABNORMAL
PROTHROM AB SERPL-ACNC: 11.9 SEC — SIGNIFICANT CHANGE UP (ref 10–12.9)
RBC # BLD: 5.28 M/UL — SIGNIFICANT CHANGE UP (ref 4.2–5.8)
RBC # FLD: 12.3 % — SIGNIFICANT CHANGE UP (ref 10.3–14.5)
RBC CASTS # UR COMP ASSIST: 1 /HPF — SIGNIFICANT CHANGE UP (ref 0–4)
SODIUM SERPL-SCNC: 141 MMOL/L — SIGNIFICANT CHANGE UP (ref 135–145)
SP GR SPEC: 1.02 — SIGNIFICANT CHANGE UP (ref 1.01–1.02)
UROBILINOGEN FLD QL: NEGATIVE — SIGNIFICANT CHANGE UP
WBC # BLD: 8 K/UL — SIGNIFICANT CHANGE UP (ref 3.8–10.5)
WBC # FLD AUTO: 8 K/UL — SIGNIFICANT CHANGE UP (ref 3.8–10.5)
WBC UR QL: 2 /HPF — SIGNIFICANT CHANGE UP (ref 0–5)

## 2019-08-05 PROCEDURE — 96375 TX/PRO/DX INJ NEW DRUG ADDON: CPT

## 2019-08-05 PROCEDURE — 74177 CT ABD & PELVIS W/CONTRAST: CPT

## 2019-08-05 PROCEDURE — 96374 THER/PROPH/DIAG INJ IV PUSH: CPT | Mod: XU

## 2019-08-05 PROCEDURE — 99284 EMERGENCY DEPT VISIT MOD MDM: CPT | Mod: 25

## 2019-08-05 PROCEDURE — 99284 EMERGENCY DEPT VISIT MOD MDM: CPT

## 2019-08-05 PROCEDURE — 85027 COMPLETE CBC AUTOMATED: CPT

## 2019-08-05 PROCEDURE — 85610 PROTHROMBIN TIME: CPT

## 2019-08-05 PROCEDURE — 85730 THROMBOPLASTIN TIME PARTIAL: CPT

## 2019-08-05 PROCEDURE — 82330 ASSAY OF CALCIUM: CPT

## 2019-08-05 PROCEDURE — 84295 ASSAY OF SERUM SODIUM: CPT

## 2019-08-05 PROCEDURE — 83605 ASSAY OF LACTIC ACID: CPT

## 2019-08-05 PROCEDURE — 84132 ASSAY OF SERUM POTASSIUM: CPT

## 2019-08-05 PROCEDURE — 82947 ASSAY GLUCOSE BLOOD QUANT: CPT

## 2019-08-05 PROCEDURE — 83690 ASSAY OF LIPASE: CPT

## 2019-08-05 PROCEDURE — 85014 HEMATOCRIT: CPT

## 2019-08-05 PROCEDURE — 82435 ASSAY OF BLOOD CHLORIDE: CPT

## 2019-08-05 PROCEDURE — 87086 URINE CULTURE/COLONY COUNT: CPT

## 2019-08-05 PROCEDURE — 82803 BLOOD GASES ANY COMBINATION: CPT

## 2019-08-05 PROCEDURE — 74177 CT ABD & PELVIS W/CONTRAST: CPT | Mod: 26

## 2019-08-05 PROCEDURE — 80053 COMPREHEN METABOLIC PANEL: CPT

## 2019-08-05 PROCEDURE — 81001 URINALYSIS AUTO W/SCOPE: CPT

## 2019-08-05 RX ORDER — ONDANSETRON 8 MG/1
4 TABLET, FILM COATED ORAL ONCE
Refills: 0 | Status: COMPLETED | OUTPATIENT
Start: 2019-08-05 | End: 2019-08-05

## 2019-08-05 RX ORDER — SODIUM CHLORIDE 9 MG/ML
1000 INJECTION INTRAMUSCULAR; INTRAVENOUS; SUBCUTANEOUS ONCE
Refills: 0 | Status: COMPLETED | OUTPATIENT
Start: 2019-08-05 | End: 2019-08-05

## 2019-08-05 RX ORDER — ACETAMINOPHEN 500 MG
650 TABLET ORAL ONCE
Refills: 0 | Status: COMPLETED | OUTPATIENT
Start: 2019-08-05 | End: 2019-08-05

## 2019-08-05 RX ORDER — DIPHENHYDRAMINE HCL 50 MG
25 CAPSULE ORAL ONCE
Refills: 0 | Status: COMPLETED | OUTPATIENT
Start: 2019-08-05 | End: 2019-08-05

## 2019-08-05 RX ADMIN — ONDANSETRON 4 MILLIGRAM(S): 8 TABLET, FILM COATED ORAL at 18:45

## 2019-08-05 RX ADMIN — SODIUM CHLORIDE 2000 MILLILITER(S): 9 INJECTION INTRAMUSCULAR; INTRAVENOUS; SUBCUTANEOUS at 18:48

## 2019-08-05 RX ADMIN — Medication 650 MILLIGRAM(S): at 18:48

## 2019-08-05 RX ADMIN — Medication 25 MILLIGRAM(S): at 18:47

## 2019-08-05 NOTE — ED PROVIDER NOTE - NS_EDPROVIDERDISPOUSERTYPE_ED_A_ED
Attending Attestation (For Attendings USE Only)... Medical Students Attestation (For Medical Student USE Only).../Attending Attestation (For Attendings USE Only)...

## 2019-08-05 NOTE — ED PROVIDER NOTE - OBJECTIVE STATEMENT
38 yo male PMhx kidney stones presents to the ED c/o R sided abdominal pain and rash x 2 weeks. 38 yo male PMhx kidney stones presents to the ED c/o R sided abdominal pain and rash x 2 weeks. Pain is constant and worse with eating. He has had diarrhea and nausea after meals for the past 2 weeks. He also endorses bloating, decreased appetite, fatigue, and burning with urination. He denies any fever, vomiting, hematuria, hematochezia, recent travel, illness, or sick contacts at home. 40 yo male PMhx kidney stones presents to the ED c/o R sided abdominal pain x 2 weeks and rash x 3 days. Pain is constant and worse with eating. He has had diarrhea and nausea after meals for the past 2 weeks, although has had normal bowel movements at times in between. He also endorses bloating, decreased appetite, fatigue, and burning with urination. Rash states started first with small pinpoint lesion to R wrist then extended to b/l arms and chest, now endorsing lesions to b/l groin as well. Lesions are itchy, has been taking benadryl with mild relief. Denies any fever, vomiting, hematuria, hematochezia, recent travel, illness, or sick contacts at home, new/recent exposures (new soaps, lotions, detergents etc), bites, chest pain, shortness of breath. 38 yo male PMhx kidney stones presents to the ED c/o R sided abdominal pain x 2 weeks and rash x 3 days. Pain is constant and worse with eating, does not feel like previous kidney stone pain. He has had diarrhea and nausea after meals for the past 2 weeks, although has had normal bowel movements at times in between. He also endorses bloating, decreased appetite, fatigue, and burning with urination. Rash states started first with small pinpoint lesion to R wrist then extended to b/l arms and chest, now endorsing lesions to b/l groin as well. Lesions are itchy, has been taking benadryl with mild relief. Denies any fever, vomiting, hematuria, hematochezia, recent travel, illness, or sick contacts at home, new/recent exposures (new soaps, lotions, detergents etc), bites, chest pain, shortness of breath. 40 yo male PMhx kidney stones presents to the ED c/o R sided abdominal pain x 2 weeks and rash x 3 days. Pain is constant and worse with eating, does not feel like previous kidney stone pain. He has had diarrhea and nausea after meals for the past 2 weeks, although has had normal bowel movements at times in between. He also endorses bloating, decreased appetite, fatigue, and burning with urination. Rash states started first with small pinpoint lesion to R wrist then extended to b/l arms and chest, now endorsing lesions to b/l groin as well. Lesions are itchy, has been taking benadryl with mild relief. Denies any fever/chills,, vomiting, hematuria, hematochezia, recent travel, illness, or sick contacts at home, new/recent exposures (new soaps, lotions, detergents etc), bites, chest pain, shortness of breath, neck pain/stiffness, photophobia, speech/visual changes, numbness/tingling. 38 yo male PMhx kidney stones presents to the ED c/o R sided abdominal pain x 2 weeks and rash x 3 days. Pain is constant and worse with eating, does not feel like previous kidney stone pain. He has had diarrhea and nausea after meals for the past 2 weeks, although has had normal bowel movements at times in between. He also endorses bloating, decreased appetite, fatigue, and burning with urination. States rash started first with small pinpoint lesion to R wrist then extended to b/l arms and chest, now endorsing lesions to b/l groin as well. Lesions are itchy, has been taking benadryl with mild relief. Denies any fever/chills,, vomiting, hematuria, hematochezia, recent travel, illness, or sick contacts at home, new/recent exposures (new soaps, lotions, detergents etc), bites, chest pain, shortness of breath, neck pain/stiffness, photophobia, speech/visual changes, numbness/tingling. 40 yo male PMhx kidney stones presents to the ED c/o R sided abdominal pain x 2 weeks and rash x 3 days. Pain is constant and worse with eating, does not feel like previous kidney stone pain. He has had diarrhea and nausea after meals for the past 2 weeks, although has had normal bowel movements at times in between. He also endorses bloating, decreased appetite, fatigue, and burning with urination. States rash started first with small pinpoint lesion to R wrist then extended to b/l arms and chest, now endorsing lesions to b/l groin as well. Lesions are itchy, has been taking benadryl with mild relief. Denies any fever/chills, vomiting, hematuria, hematochezia, recent travel, illness, or sick contacts at home, new/recent exposures (new soaps, lotions, detergents etc), bites, chest pain, shortness of breath, neck pain/stiffness, photophobia, speech/visual changes, numbness/tingling. 38 yo male PMhx kidney stones presents to the ED c/o R sided abdominal pain x 2 weeks and rash x 3 days. Pain is constant and worse with eating, does not feel like previous kidney stone pain. He has had diarrhea and nausea after meals for the past 2 weeks, although has had normal bowel movements at times in between. He also endorses bloating, decreased appetite, fatigue, and burning with urination. States rash started first with small pinpoint lesion to R wrist then extended to b/l arms and chest, now endorsing lesions to b/l groin as well. Lesions are itchy, has been taking benadryl with mild relief. Denies any fever/chills, vomiting, hematuria, hematochezia, penile discharge, STD hx, recent travel, illness, or sick contacts at home, new/recent exposures (new soaps, lotions, detergents etc), bites, chest pain, shortness of breath, neck pain/stiffness, photophobia, speech/visual changes, numbness/tingling.

## 2019-08-05 NOTE — ED ADULT NURSE REASSESSMENT NOTE - NS ED NURSE REASSESS COMMENT FT1
Patient returned from ultrasound at this time. Patient a&ox3, no acute distress, resp nonlabored, resting comfortably in bed with family at bedside. Denies headache, dizziness, chest pain, palpitations, SOB, abd pain, N/V/D, urinary symptoms, fevers, chills, weakness at this time. Patient awaiting ultrasound results. Pt placed in position of comfort. Pt educated on call bell system and provided call bell. Bed in lowest position, wheels locked, appropriate side rails raised. Pt denies needs at this time.

## 2019-08-05 NOTE — ED ADULT TRIAGE NOTE - CHIEF COMPLAINT QUOTE
Pt complaining of pain to the right abd x 2 weeks getting worse. Pt took Tylenols for pain which did not provide any relieve. Rash to body since Saturday getting worse. Pt took Benadryl with no relieve

## 2019-08-05 NOTE — ED PROVIDER NOTE - CLINICAL SUMMARY MEDICAL DECISION MAKING FREE TEXT BOX
Matilde: 39 yea rold male with right sided abdominal pain x 2 weeks, worse after eating.  + cramping abdominal pain and diarrhea after eating. will get labs, ivf, ct a/p, reassess r/o colitis. also with dysuria.   also with itchy rash to upper body x 3 days. unknown cause.  no fever, no chills. possible allergic reaction?

## 2019-08-05 NOTE — ED PROVIDER NOTE - CHIEF COMPLAINT
The patient is a 39y Male complaining of The patient is a 39y Male complaining of abdominal pain and rash

## 2019-08-05 NOTE — ED PROVIDER NOTE - PROGRESS NOTE DETAILS
CTAP and labwork unremarkable. UA negative for infection. Pt feeling mild improvement in sxs s/p meds. Discussed all results w/ pt and wife at bedside (made aware of hernia findings on CT and need for outpt f/u), and need for outpatient follow up for continued evaluation/management regarding his sxs. Etiology could be viral so encouraged supportive tx for now, however advised should f/u with medicine clinic in 2-3 days. VSS. Pt stable for discharge. Attending aware. - Timothy Vidales PA-C

## 2019-08-05 NOTE — ED PROVIDER NOTE - NS ED ATTENDING STATEMENT MOD
I have personally performed a face to face diagnostic evaluation on this patient. I have reviewed the ACP note and agree with the history, exam and plan of care, except as noted. I have personally seen and examined this patient.  I have fully participated in the care of this patient. I have reviewed all pertinent clinical information, including history, physical exam, plan and the Medical Student and Resident’s note and agree except as noted.

## 2019-08-05 NOTE — ED ADULT NURSE NOTE - OBJECTIVE STATEMENT
39y male arrived to ED complaining of ab pain, subjective fever and rash. Patients wife at bedside endorses concern for ab distention, ab pain on the right side, nausea, chills, diarrhea after every meal, weakness, body aches, burning with urination. Ab pain ongoing x2 weeks, rash x3 days. Patient denies CP, SOB. PMHx kidney stones. Rash is b/l arms, groin, knees, chest and neck. Rash is burning and itchy. Patient works as . Denies exposure to anything new outside/new creams or detergents.

## 2019-08-05 NOTE — ED PROVIDER NOTE - NSFOLLOWUPCLINICS_GEN_ALL_ED_FT
[facility] General Internal Medicine  General Internal Medicine  [address] [id number]  Phone: [phone]  Fax:   Follow Up Time: 1-3 Days

## 2019-08-05 NOTE — ED PROVIDER NOTE - NSFOLLOWUPINSTRUCTIONS_ED_ALL_ED_FT
1. Follow up with your PMD in 1-2 days. If you do not have a PMD please follow up with our general internal medicine clinic ([phone]) for further management regarding your symptoms. Please bring a copy of all your results with you to your appointments.   2. Rest, stay hydrated.  For itching take benadryl 50 mg every 8 hours (CAUTION: causes drowsiness so do not drive while taking). Additionally recommend taking over-the-counter Pepcid 20 mg twice daily for additional symptom relief.   3. Take Tylenol 975 mg every 6 hours as needed for pain or fever greater than 99.9. Take Motrin 600mg every 8hrs with food for additional pain relief.   4. Return to the ED immediately if you develop any new/worsening symptoms including fever/chills, worsening pain, dizziness, weakness, neck pain/stiffness, vomiting, chest pain or any other concerning symptoms.

## 2019-08-06 LAB
CULTURE RESULTS: SIGNIFICANT CHANGE UP
SPECIMEN SOURCE: SIGNIFICANT CHANGE UP

## 2020-04-14 NOTE — ED PROVIDER NOTE - DR NAME
Quality 402: Tobacco Use And Help With Quitting Among Adolescents: Patient screened for tobacco and never smoked Quality 431: Preventive Care And Screening: Unhealthy Alcohol Use - Screening: Patient screened for unhealthy alcohol use using a single question and scores less than 2 times per year Detail Level: Zone Quality 130: Documentation Of Current Medications In The Medical Record: Current Medications Documented Ibeth Clayton

## 2021-12-14 NOTE — ED PROVIDER NOTE - PRO INTERPRETER NEED 2
Pt in with parents, per mom there was a covid outbreak at [facility], pt has had a cough and congestion x 6 days.
English

## 2024-08-22 NOTE — ED PROVIDER NOTE - NSDCPRINTRESULTS_ED_ALL_ED
thin liquid/mildly thick/regular solid Patient requests all Lab and Radiology Results on their Discharge Instructions